# Patient Record
Sex: FEMALE | Race: BLACK OR AFRICAN AMERICAN | NOT HISPANIC OR LATINO | URBAN - METROPOLITAN AREA
[De-identification: names, ages, dates, MRNs, and addresses within clinical notes are randomized per-mention and may not be internally consistent; named-entity substitution may affect disease eponyms.]

---

## 2021-07-17 ENCOUNTER — EMERGENCY (EMERGENCY)
Facility: HOSPITAL | Age: 56
LOS: 1 days | Discharge: HOME | End: 2021-07-17
Admitting: EMERGENCY MEDICINE
Payer: COMMERCIAL

## 2021-07-17 VITALS
TEMPERATURE: 99 F | HEART RATE: 76 BPM | SYSTOLIC BLOOD PRESSURE: 153 MMHG | RESPIRATION RATE: 16 BRPM | WEIGHT: 164.91 LBS | DIASTOLIC BLOOD PRESSURE: 94 MMHG | OXYGEN SATURATION: 97 % | HEIGHT: 64 IN

## 2021-07-17 DIAGNOSIS — H57.11 OCULAR PAIN, RIGHT EYE: ICD-10-CM
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
## 2021-07-17 PROCEDURE — 99282 EMERGENCY DEPT VISIT SF MDM: CPT

## 2021-07-17 PROCEDURE — 99283 EMERGENCY DEPT VISIT LOW MDM: CPT

## 2021-07-17 RX ADMIN — Medication 1 DROP(S): at 21:01
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
## 2021-07-17 NOTE — ED PROVIDER NOTE - NSFOLLOWUPINSTRUCTIONS_ED_ALL_ED_FT
Eye Pain    WHAT YOU NEED TO KNOW:    Eye pain may be caused by a problem within your eye. A problem or condition in another body area can also cause pain that travels to your eye. Some causes of eye pain include dry eyes, inflammation, a sinus infection, or a cluster headache.    DISCHARGE INSTRUCTIONS:    Medicines: You may need any of the following:  •Artificial tears are eyedrops that can help moisturize your eyes and relieve your pain. Ask your healthcare provider how often to use artificial tears.      •NSAIDs, such as ibuprofen, help decrease swelling, pain, and fever. This medicine is available with or without a doctor's order. NSAIDs can cause stomach bleeding or kidney problems in certain people. If you take blood thinner medicine, always ask if NSAIDs are safe for you. Always read the medicine label and follow directions. Do not give these medicines to children under 6 months of age without direction from your child's healthcare provider.      •Take your medicine as directed. Contact your healthcare provider if you think your medicine is not helping or if you have side effects. Tell him of her if you are allergic to any medicine. Keep a list of the medicines, vitamins, and herbs you take. Include the amounts, and when and why you take them. Bring the list or the pill bottles to follow-up visits. Carry your medicine list with you in case of an emergency.      Follow up with your healthcare provider as directed: You may be referred to an eye specialist. Write down your questions so you remember to ask them during your visits.    Contact your healthcare provider if:   •You have a fever.      •Your eye pain gets worse when you move your eyes.      •You have questions or concerns about your condition or care.      Return to the emergency department if:   •You have any vision loss.      •You have sudden vision changes such as blurred vision, double vision, or seeing halos around lights.      •You develop severe eye pain.

## 2021-07-17 NOTE — ED ADULT NURSE NOTE - CHPI ED NUR SYMPTOMS NEG
no discharge/no double vision/no drainage/no foreign body/no itching/no photophobia/no purulent drainage

## 2021-07-17 NOTE — ED PROVIDER NOTE - PATIENT PORTAL LINK FT
You can access the FollowMyHealth Patient Portal offered by [facility] by registering at the following website: [email or web address]. By joining [employer]’s FollowMyHealth portal, you will also be able to view your health information using other applications (apps) compatible with our system.
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
## 2021-07-17 NOTE — ED PROVIDER NOTE - EYES, MLM
right eye no swelling, no discharge, PERRLA, EOMI.  visual acuity 20/20, fluorescein stain shows no corneal abrasion/ulcer/fb
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
## 2024-04-17 NOTE — ED ADULT NURSE NOTE - NSSEPSISSUSPECTED_ED_A_ED
Nerve Block        The Block:  Depending on where your pain is the doctor will inject the joint nerves with numbing medicine to determine if the joint is the source of pain. Before your facet injection, tell your doctor if you have any allergies or take any blood-thinning medications.     Discharge Instructions:  You were given a number of medications during the procedure. These may include sedatives, local anesthetics steroid and other medicines. Any of these drugs of the procedure itself can cause side effects including drowsiness, temporary numbness, weakness, and soreness.    Take your medications as usual. If you stopped taking your blood thinning medication, please check with your doctor about taking this again.   You may apply ice: 20 minutes on, 30 minutes off.   This injection may cause increased pain for 1 to 2 days. Use your pain medicines as usual.   You may remove the bandage after several hours.   You may bathe or shower the next day. Avoid hot tubs and whirlpool tubs with jet sprays for 24 hours.  A small bruise and tenderness at the injection site ir normal for 1 to 2 days.  If you are diabetic, your blood glucose levels may increase for several days, call your primary care doctor if the blood sugar levels concern you.      Call us if you have:  Excessive or abnormal bleeding, persistent chills, or fever over 100 degrees Fahrenheit.   A major change in the patter or level of your pain.   A severe headache that does not go away with usual treatment methods.  If after 2 weeks you have no relief, please contact the office at [phone] to schedule a follow up. \  
No